# Patient Record
Sex: MALE | Race: WHITE
[De-identification: names, ages, dates, MRNs, and addresses within clinical notes are randomized per-mention and may not be internally consistent; named-entity substitution may affect disease eponyms.]

---

## 2017-04-19 ENCOUNTER — HOSPITAL ENCOUNTER (OUTPATIENT)
Dept: HOSPITAL 17 - HSDC | Age: 72
Setting detail: OBSERVATION
LOS: 1 days | Discharge: HOME | End: 2017-04-20
Attending: OTOLARYNGOLOGY | Admitting: OTOLARYNGOLOGY
Payer: MEDICARE

## 2017-04-19 VITALS
TEMPERATURE: 98.1 F | DIASTOLIC BLOOD PRESSURE: 72 MMHG | RESPIRATION RATE: 18 BRPM | SYSTOLIC BLOOD PRESSURE: 125 MMHG | OXYGEN SATURATION: 96 % | HEART RATE: 71 BPM

## 2017-04-19 VITALS
OXYGEN SATURATION: 95 % | RESPIRATION RATE: 18 BRPM | SYSTOLIC BLOOD PRESSURE: 137 MMHG | DIASTOLIC BLOOD PRESSURE: 78 MMHG | HEART RATE: 70 BPM | TEMPERATURE: 96.7 F

## 2017-04-19 VITALS — WEIGHT: 213.63 LBS | HEIGHT: 71 IN | BODY MASS INDEX: 29.91 KG/M2

## 2017-04-19 VITALS
TEMPERATURE: 96.1 F | HEART RATE: 76 BPM | SYSTOLIC BLOOD PRESSURE: 118 MMHG | DIASTOLIC BLOOD PRESSURE: 71 MMHG | OXYGEN SATURATION: 95 % | RESPIRATION RATE: 18 BRPM

## 2017-04-19 DIAGNOSIS — J34.3: ICD-10-CM

## 2017-04-19 DIAGNOSIS — R09.81: ICD-10-CM

## 2017-04-19 DIAGNOSIS — J34.2: Primary | ICD-10-CM

## 2017-04-19 DIAGNOSIS — H61.22: ICD-10-CM

## 2017-04-19 DIAGNOSIS — R51: ICD-10-CM

## 2017-04-19 PROCEDURE — 30520 REPAIR OF NASAL SEPTUM: CPT

## 2017-04-19 PROCEDURE — 00160 ANES PX NOSE&SINUS NOS: CPT

## 2017-04-19 PROCEDURE — G0378 HOSPITAL OBSERVATION PER HR: HCPCS

## 2017-04-19 PROCEDURE — 69210 REMOVE IMPACTED EAR WAX UNI: CPT

## 2017-04-19 PROCEDURE — 30140 RESECT INFERIOR TURBINATE: CPT

## 2017-04-19 RX ADMIN — HYDROCODONE BITARTRATE AND ACETAMINOPHEN PRN TAB: 5; 325 TABLET ORAL at 22:44

## 2017-04-19 RX ADMIN — SODIUM CHLORIDE SCH MLS/HR: 900 INJECTION INTRAVENOUS at 22:42

## 2017-04-19 RX ADMIN — METOPROLOL TARTRATE SCH MG: 25 TABLET, FILM COATED ORAL at 22:39

## 2017-04-19 RX ADMIN — SODIUM CHLORIDE SCH MLS/HR: 900 INJECTION INTRAVENOUS at 16:00

## 2017-04-20 VITALS
DIASTOLIC BLOOD PRESSURE: 53 MMHG | TEMPERATURE: 96.9 F | SYSTOLIC BLOOD PRESSURE: 101 MMHG | HEART RATE: 79 BPM | OXYGEN SATURATION: 93 % | RESPIRATION RATE: 18 BRPM

## 2017-04-20 VITALS
SYSTOLIC BLOOD PRESSURE: 131 MMHG | RESPIRATION RATE: 20 BRPM | DIASTOLIC BLOOD PRESSURE: 62 MMHG | TEMPERATURE: 97.5 F | OXYGEN SATURATION: 95 % | HEART RATE: 70 BPM

## 2017-04-20 VITALS
DIASTOLIC BLOOD PRESSURE: 60 MMHG | SYSTOLIC BLOOD PRESSURE: 99 MMHG | OXYGEN SATURATION: 95 % | RESPIRATION RATE: 20 BRPM | HEART RATE: 69 BPM | TEMPERATURE: 98.1 F

## 2017-04-20 RX ADMIN — METOPROLOL TARTRATE SCH MG: 25 TABLET, FILM COATED ORAL at 08:37

## 2017-04-20 RX ADMIN — SODIUM CHLORIDE SCH MLS/HR: 900 INJECTION INTRAVENOUS at 08:00

## 2017-04-20 RX ADMIN — HYDROCODONE BITARTRATE AND ACETAMINOPHEN PRN TAB: 5; 325 TABLET ORAL at 03:06

## 2017-06-23 NOTE — MP
cc:

KENRICK ESPINAL M.D.

****

 

 

DATE OF SURGERY:  4/19/2017

 

 

SURGEON

Dr. Kenrick Espinal

 

PREOPERATIVE DIAGNOSIS

1. Nasal airway obstruction.

2. Nasal septal deviation.

3. Hypertrophy of inferior turbinates.

 

POSTOPERATIVE DIAGNOSIS

1. Open repair nasal septal fracture.

2. Bilateral submucosal resection of inferior turbinates.

 

INDICATIONS

The indications are documented in the history and physical.

 

DESCRIPTION OF OPERATION

The patient was taken to OR #6, and placed in the supine position.  Following

induction of general anesthesia and intubation the nose was packed bilaterally

with cotton pledgets saturated in 0.05% oxymetazoline.  The nasal septum and

inferior turbinates were injected with a total of 12 mL of 1% Xylocaine with

epinephrine 1:100,000.  He was then prepped and draped for surgery.  Packing

was removed and a hemitransfixion incision was made in the left nasal

vestibule.  Through this incision the mucosa of septum was elevated bilaterally

as far as the junction of the bony and cartilaginous septum.  This revealed

quadrangular cartilage which showed evidence of long-healed cartilaginous

fracture with numerous fragments obstructing the nasal airway.  An accumulative

area of 2 x 2.5 cm was removed in a piecemeal fashion using a Eddy elevator

and Chivo-Yang forceps.  1.5 cm dorsal and caudal cartilaginous struts

were preserved.  When this was completed the mucosa was elevated from the bony

septum and from the maxillary crest.  These were removed using Chivo-Yang

forceps on the bony septum and a 6 mm Jann chisel on the maxillary crest.

The incision was then closed with a running suture of 4-0 chromic and the

mucosal layers of septum were approximated to each other with a quilting stitch

of 4-0 plain gut.  The inferior turbinates were then fractured out medially and

stab incisions were opened along their inferior surfaces.  Through these

incisions the submucosal soft tissue was reduced using a curet and preserving

the conchal bone.  The incision was then cauterized using the suction Bovie at

35 kaufman and the remnants of the inferior turbinates were re-lateralized to the

lateral nasal walls.  The nose was then packed with Merocel tampons coated in

bacitracin ointment and the procedure was terminated.  The patient was reversed

from anesthesia and taken to Recovery in good condition.  There were no

complications.  Blood loss was 80 mL.

 

 

 

 

 

                              _________________________________

                              MD MARIPOSA Askew

D:  6/22/2017/1:02 PM

T:  6/23/2017/7:48 AM

Visit #:  [id number]

Job #:  [id number]

## 2018-05-30 ENCOUNTER — HOSPITAL ENCOUNTER (OUTPATIENT)
Dept: HOSPITAL 17 - NEPE | Age: 73
Setting detail: OBSERVATION
Discharge: HOME | End: 2018-05-30
Attending: INTERNAL MEDICINE | Admitting: INTERNAL MEDICINE
Payer: MEDICARE

## 2018-05-30 VITALS
RESPIRATION RATE: 20 BRPM | OXYGEN SATURATION: 95 % | SYSTOLIC BLOOD PRESSURE: 142 MMHG | HEART RATE: 74 BPM | TEMPERATURE: 98.5 F | DIASTOLIC BLOOD PRESSURE: 104 MMHG

## 2018-05-30 VITALS
SYSTOLIC BLOOD PRESSURE: 158 MMHG | DIASTOLIC BLOOD PRESSURE: 94 MMHG | OXYGEN SATURATION: 97 % | RESPIRATION RATE: 18 BRPM | HEART RATE: 70 BPM

## 2018-05-30 VITALS
HEART RATE: 69 BPM | DIASTOLIC BLOOD PRESSURE: 82 MMHG | TEMPERATURE: 97.6 F | RESPIRATION RATE: 17 BRPM | OXYGEN SATURATION: 97 % | SYSTOLIC BLOOD PRESSURE: 143 MMHG

## 2018-05-30 VITALS — HEART RATE: 69 BPM

## 2018-05-30 VITALS
OXYGEN SATURATION: 94 % | TEMPERATURE: 96.3 F | HEART RATE: 71 BPM | SYSTOLIC BLOOD PRESSURE: 116 MMHG | RESPIRATION RATE: 19 BRPM | DIASTOLIC BLOOD PRESSURE: 67 MMHG

## 2018-05-30 VITALS — OXYGEN SATURATION: 97 %

## 2018-05-30 VITALS — BODY MASS INDEX: 31.17 KG/M2 | WEIGHT: 222.67 LBS | HEIGHT: 71 IN

## 2018-05-30 VITALS
DIASTOLIC BLOOD PRESSURE: 71 MMHG | TEMPERATURE: 98.1 F | SYSTOLIC BLOOD PRESSURE: 119 MMHG | HEART RATE: 72 BPM | OXYGEN SATURATION: 97 % | RESPIRATION RATE: 18 BRPM

## 2018-05-30 DIAGNOSIS — I71.4: ICD-10-CM

## 2018-05-30 DIAGNOSIS — R09.81: ICD-10-CM

## 2018-05-30 DIAGNOSIS — R53.83: ICD-10-CM

## 2018-05-30 DIAGNOSIS — I25.10: ICD-10-CM

## 2018-05-30 DIAGNOSIS — I47.1: ICD-10-CM

## 2018-05-30 DIAGNOSIS — I25.82: ICD-10-CM

## 2018-05-30 DIAGNOSIS — Z87.891: ICD-10-CM

## 2018-05-30 DIAGNOSIS — E78.5: ICD-10-CM

## 2018-05-30 DIAGNOSIS — Z86.79: ICD-10-CM

## 2018-05-30 DIAGNOSIS — G89.29: ICD-10-CM

## 2018-05-30 DIAGNOSIS — Z79.899: ICD-10-CM

## 2018-05-30 DIAGNOSIS — I71.9: ICD-10-CM

## 2018-05-30 DIAGNOSIS — R94.31: ICD-10-CM

## 2018-05-30 DIAGNOSIS — I42.2: ICD-10-CM

## 2018-05-30 DIAGNOSIS — E78.00: ICD-10-CM

## 2018-05-30 DIAGNOSIS — R06.03: Primary | ICD-10-CM

## 2018-05-30 DIAGNOSIS — K57.92: ICD-10-CM

## 2018-05-30 DIAGNOSIS — Z85.820: ICD-10-CM

## 2018-05-30 DIAGNOSIS — Z79.01: ICD-10-CM

## 2018-05-30 DIAGNOSIS — J18.9: ICD-10-CM

## 2018-05-30 DIAGNOSIS — F41.9: ICD-10-CM

## 2018-05-30 DIAGNOSIS — I48.0: ICD-10-CM

## 2018-05-30 DIAGNOSIS — M54.9: ICD-10-CM

## 2018-05-30 DIAGNOSIS — R20.0: ICD-10-CM

## 2018-05-30 DIAGNOSIS — R51: ICD-10-CM

## 2018-05-30 DIAGNOSIS — M54.30: ICD-10-CM

## 2018-05-30 DIAGNOSIS — Z95.0: ICD-10-CM

## 2018-05-30 DIAGNOSIS — I10: ICD-10-CM

## 2018-05-30 LAB
ALBUMIN SERPL-MCNC: 4 GM/DL (ref 3.4–5)
ALP SERPL-CCNC: 94 U/L (ref 45–117)
ALT SERPL-CCNC: 22 U/L (ref 12–78)
AST SERPL-CCNC: 15 U/L (ref 15–37)
BASOPHILS # BLD AUTO: 0.1 TH/MM3 (ref 0–0.2)
BASOPHILS NFR BLD: 0.9 % (ref 0–2)
BILIRUB SERPL-MCNC: 2.7 MG/DL (ref 0.2–1)
BUN SERPL-MCNC: 15 MG/DL (ref 7–18)
CALCIUM SERPL-MCNC: 8.6 MG/DL (ref 8.5–10.1)
CHLORIDE SERPL-SCNC: 107 MEQ/L (ref 98–107)
COLOR UR: YELLOW
CREAT SERPL-MCNC: 1.08 MG/DL (ref 0.6–1.3)
EOSINOPHIL # BLD: 0.1 TH/MM3 (ref 0–0.4)
EOSINOPHIL NFR BLD: 1.3 % (ref 0–4)
ERYTHROCYTE [DISTWIDTH] IN BLOOD BY AUTOMATED COUNT: 15.7 % (ref 11.6–17.2)
GFR SERPLBLD BASED ON 1.73 SQ M-ARVRAT: 67 ML/MIN (ref 89–?)
GLUCOSE SERPL-MCNC: 126 MG/DL (ref 74–106)
GLUCOSE UR STRIP-MCNC: (no result) MG/DL
HCO3 BLD-SCNC: 24.9 MEQ/L (ref 21–32)
HCT VFR BLD CALC: 42.2 % (ref 39–51)
HGB BLD-MCNC: 14.5 GM/DL (ref 13–17)
HGB UR QL STRIP: (no result)
INR PPP: 1.3 RATIO
KETONES UR STRIP-MCNC: (no result) MG/DL
LYMPHOCYTES # BLD AUTO: 1.1 TH/MM3 (ref 1–4.8)
LYMPHOCYTES NFR BLD AUTO: 10.8 % (ref 9–44)
MAGNESIUM SERPL-MCNC: 2.1 MG/DL (ref 1.5–2.5)
MCH RBC QN AUTO: 29.9 PG (ref 27–34)
MCHC RBC AUTO-ENTMCNC: 34.3 % (ref 32–36)
MCV RBC AUTO: 87 FL (ref 80–100)
MONOCYTE #: 0.6 TH/MM3 (ref 0–0.9)
MONOCYTES NFR BLD: 6.3 % (ref 0–8)
MUCOUS THREADS #/AREA URNS LPF: (no result) /LPF
NEUTROPHILS # BLD AUTO: 8.3 TH/MM3 (ref 1.8–7.7)
NEUTROPHILS NFR BLD AUTO: 80.7 % (ref 16–70)
NITRITE UR QL STRIP: (no result)
PLATELET # BLD: 128 TH/MM3 (ref 150–450)
PMV BLD AUTO: 9.6 FL (ref 7–11)
PROT SERPL-MCNC: 7.2 GM/DL (ref 6.4–8.2)
PROTHROMBIN TIME: 13.2 SEC (ref 9.8–11.6)
RBC # BLD AUTO: 4.85 MIL/MM3 (ref 4.5–5.9)
SODIUM SERPL-SCNC: 141 MEQ/L (ref 136–145)
SP GR UR STRIP: 1.01 (ref 1–1.03)
SQUAMOUS #/AREA URNS HPF: <1 /HPF (ref 0–5)
TROPONIN I SERPL-MCNC: (no result) NG/ML (ref 0.02–0.05)
TROPONIN I SERPL-MCNC: (no result) NG/ML (ref 0.02–0.05)
TROPONIN I SERPL-MCNC: 0.02 NG/ML (ref 0.02–0.05)
URINE LEUKOCYTE ESTERASE: (no result)
WBC # BLD AUTO: 10.3 TH/MM3 (ref 4–11)

## 2018-05-30 PROCEDURE — 99285 EMERGENCY DEPT VISIT HI MDM: CPT

## 2018-05-30 PROCEDURE — 71045 X-RAY EXAM CHEST 1 VIEW: CPT

## 2018-05-30 PROCEDURE — 85730 THROMBOPLASTIN TIME PARTIAL: CPT

## 2018-05-30 PROCEDURE — 82550 ASSAY OF CK (CPK): CPT

## 2018-05-30 PROCEDURE — 83880 ASSAY OF NATRIURETIC PEPTIDE: CPT

## 2018-05-30 PROCEDURE — 83690 ASSAY OF LIPASE: CPT

## 2018-05-30 PROCEDURE — 93005 ELECTROCARDIOGRAM TRACING: CPT

## 2018-05-30 PROCEDURE — 83735 ASSAY OF MAGNESIUM: CPT

## 2018-05-30 PROCEDURE — 80053 COMPREHEN METABOLIC PANEL: CPT

## 2018-05-30 PROCEDURE — 81001 URINALYSIS AUTO W/SCOPE: CPT

## 2018-05-30 PROCEDURE — 85610 PROTHROMBIN TIME: CPT

## 2018-05-30 PROCEDURE — G0378 HOSPITAL OBSERVATION PER HR: HCPCS

## 2018-05-30 PROCEDURE — 85025 COMPLETE CBC W/AUTO DIFF WBC: CPT

## 2018-05-30 PROCEDURE — 84484 ASSAY OF TROPONIN QUANT: CPT
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
## 2018-05-30 NOTE — HHI.HP
HPI


Primary Care Physician


[facility]


Chief Complaint


High blood pressure


History of Present Illness


This is a 73-year-old male with history of hypertrophic cardiomyopathy, 

hypertension, hyperlipidemia, SVT status post multiple ablations, paroxysmal 

atrial relation with ablations, ventricular paced, totally for the RCA with 

collateralization that presents to ED to be evaluated for high blood pressure 

and shortness of breath.  Denies having chest discomfort of any type.  States 

that his blood pressure is going up and down throughout the day yesterday and 

he also intermittently had shortness of breath.  No nausea or diaphoresis.  

Voices compliance with his blood pressure medication.  States he takes 

metoprolol 50 mg twice a day.  Also states he recently had a stress test and an 

echo at his office last month.  Feeling okay at this time.





Review of Systems


General: Patient denies fevers, chills, and recent travel.


HEENT: Patient denies headache, sore throat, difficulty swallowing.  


Cardiovascular: Denies chest discomfort as mentioned above.  Denies sensation 

of heart beating rapidly or irregularly.  No syncope.  Denies diaphoresis.


Respiratory: He has been intermittently short of breath.  Denies inspirational 

chest discomfort.  Denies coughing wheezing or hemoptysis.  


GI: Patient denies nausea, vomiting, diarrhea, abdominal pain, bloody stools.


Musculoskeletal: Patient denies joint pain or edema.  Denies calf pain or edema.


Neurovascular: Patient denies numbness, tingling, weakness in extremities.  

Denies headache.


Endocrine: Denies polyuria and polydipsia.


Hematologic: Denies easy bruising.


Skin: Denies rash or itching.





Past Family Social History


Allergies:  


Coded Allergies:  


     No Known Allergies (Verified , 17)


Past Medical History


Hypertrophic cardiomyopathy, SVT status post multiple ablations, proximal 

atrial fibrillation with ablations, ventricular paced, hypertension, 

hyperlipidemia, history of abdominal aortic aneurysm, and also history of 

totally occluded RCA with collateralization.  Denies diabetes.


Past Surgical History


Multiple ablations.  Cardiac catheterizations.  Pacemaker.  Cholecystectomy, 

knee replacement, deviated septum repair


Reported Medications





Reported Meds & Active Scripts


Active


Reported


Eliquis (Apixaban) 5 Mg Tab 5 Mg PO BID


Ibgard (Peppermint Oil) 90 Mg Capdr...er   


Sudafed (Pseudoephedrine HCl) 30 Mg Tab 30 Mg PO Q6H PRN


Potassium Gluconate 550 Mg Tab 1 Tab PO DAILY


Ambien (Zolpidem Tartrate) 10 Mg Tab 10 Mg PO HS PRN


Xanax (Alprazolam) 0.5 Mg Tab 0.5 Mg PO Q4H PRN


Multivitamin Adults (Multiple Vitamins W/ Minerals) 1 Tab 1 Tab PO DAILY


Metoprolol Tartrate 25 Mg Tab 50 Mg PO BID


Atorvastatin (Atorvastatin Calcium) 10 Mg Tab 10 Mg PO HS


Active Ordered Medications





Current Medications








 Medications


  (Trade)  Dose


 Ordered  Sig/Yulissa


 Route  Start Time


 Stop Time Status Last Admin


 


  (NS Flush)  2 ml  UNSCH  PRN


 IV FLUSH  18 03:15


     


 


 


  (NS Flush)  2 ml  BID


 IV FLUSH  18 09:00


     


 


 


  (Tylenol)  500 mg  Q4H  PRN


 PO  18 03:15


     


 


 


  (Xanax)  0.5 mg  Q4H  PRN


 PO  18 08:45


     


 


 


  (Lipitor)  10 mg  HS


 PO  18 21:00


     


 


 


  (Lopressor)  50 mg  BID


 PO  18 09:00


     


 


 


  (Theragran M Tab)  1 tab  DAILY


 PO  18 09:00


     


 


 


  (Ambien)  10 mg  HS  PRN


 PO  18 08:45


     


 


 


 Patient Own


 Medication  PT OWN


 MED:


 POTASS...  DAILY


 PO  18 09:00


   Future Hold  


 








Family History


There is family history of CAD.


Social History


Quit smoking 10 years ago.  Rarely has alcohol.  Denies illicit drug use.





Physical Exam


Vital Signs





Vital Signs








  Date Time  Temp Pulse Resp B/P (MAP) Pulse Ox O2 Delivery O2 Flow Rate FiO2


 


18 08:25 96.3 71 19 116/67 (83) 94   


 


18 06:26        21


 


18 05:53  69      


 


18 05:15 98.1 72 18 119/71 (87) 97   


 


18 03:43 97.6 69 17 143/82 (102) 97 Nasal Cannula 2.00 


 


18 01:23     97 Nasal Cannula 2.00 


 


18 01:06      Nasal Cannula 2.00 


 


18 00:51  70 18 158/94 (115) 97 Nasal Cannula 2.00 


 


18 00:38 98.5 74 20 142/104 (117) 95   








Physical Exam


GENERAL: This is a well-nourished, well-developed patient, in no apparent 

distress.  Patient speaks in clear complete sentences.  Patient is pleasant.


HEENT: Head is atraumatic and normocephalic.  Neck is supple without 

lymphadenopathy and trachea is midline.  No JVD or carotid bruits.


CARDIOVASCULAR: Regular rate and rhythm without murmurs, gallops, or rubs. 


RESPIRATORY: Clear to auscultation. Breath sounds equal bilaterally. No wheezes

, rales, or rhonchi.  Chest wall is nontender.  No use of accessory muscles.


GASTROINTESTINAL: Abdomen is nontender, nondistended.  Abdomen soft.  No 

obvious pulsatile mass or bruit.  No CVA tenderness.  Strong femoral pulses 

bilaterally.  Normal bowel sounds in all quadrants.


MUSCULOSKELETAL: Patient is moving upper and lower extremities freely.  No calf 

tenderness or edema, no Homans sign.  Strong pulses in upper and lower 

extremities.


NEUROLOGICAL: Patient is alert and oriented.  Cranial nerves 2-12 are grossly 

intact.  No focal deficits and speech is clear.


SKIN: No rash and turgor is normal.


Laboratory





Laboratory Tests








Test


  18


00:10 18


00:30 18


03:30 18


06:30


 


Urine Color YELLOW    


 


Urine Turbidity CLEAR    


 


Urine pH 6.0    


 


Urine Specific Gravity 1.009    


 


Urine Protein TRACE    


 


Urine Glucose (UA) NEG    


 


Urine Ketones NEG    


 


Urine Occult Blood NEG    


 


Urine Nitrite NEG    


 


Urine Bilirubin NEG    


 


Urine Urobilinogen LESS THAN 2.0    


 


Urine Leukocyte Esterase NEG    


 


Urine RBC LESS THAN 1    


 


Urine WBC 1    


 


Urine Squamous Epithelial


Cells <1 


  


  


  


 


 


Urine Granular Casts 25    


 


Urine Mucus FEW    


 


Microscopic Urinalysis Comment


  CULT NOT


INDICATED 


  


  


 


 


White Blood Count  10.3   


 


Red Blood Count  4.85   


 


Hemoglobin  14.5   


 


Hematocrit  42.2   


 


Mean Corpuscular Volume  87.0   


 


Mean Corpuscular Hemoglobin  29.9   


 


Mean Corpuscular Hemoglobin


Concent 


  34.3 


  


  


 


 


Red Cell Distribution Width  15.7   


 


Platelet Count  128   


 


Mean Platelet Volume  9.6   


 


Neutrophils (%) (Auto)  80.7   


 


Lymphocytes (%) (Auto)  10.8   


 


Monocytes (%) (Auto)  6.3   


 


Eosinophils (%) (Auto)  1.3   


 


Basophils (%) (Auto)  0.9   


 


Neutrophils # (Auto)  8.3   


 


Lymphocytes # (Auto)  1.1   


 


Monocytes # (Auto)  0.6   


 


Eosinophils # (Auto)  0.1   


 


Basophils # (Auto)  0.1   


 


CBC Comment  DIFF FINAL   


 


Differential Comment     


 


Prothrombin Time  13.2   


 


Prothromb Time International


Ratio 


  1.3 


  


  


 


 


Activated Partial


Thromboplast Time 


  35.8 


  


  


 


 


Blood Urea Nitrogen  15   


 


Creatinine  1.08   


 


Random Glucose  126   


 


Total Protein  7.2   


 


Albumin  4.0   


 


Calcium Level  8.6   


 


Magnesium Level  2.1   


 


Alkaline Phosphatase  94   


 


Aspartate Amino Transf


(AST/SGOT) 


  15 


  


  


 


 


Alanine Aminotransferase


(ALT/SGPT) 


  22 


  


  


 


 


Total Bilirubin  2.7   


 


Sodium Level  141   


 


Potassium Level  4.2   


 


Chloride Level  107   


 


Carbon Dioxide Level  24.9   


 


Anion Gap  9   


 


Estimat Glomerular Filtration


Rate 


  67 


  


  


 


 


Total Creatine Kinase  38  34  30 


 


Troponin I  0.02  LESS THAN 0.02  LESS THAN 0.02 


 


B-Type Natriuretic Peptide  243   


 


Lipase  232   








Result Diagram:  


18





Imaging





Last 48 hours Impressions








Chest X-Ray 18 Signed





Impressions: 





 CONCLUSION:  No acute cardiopulmonary disease.  





  





 








Course


EKGs show ventricular pacing.





Caprini VTE Risk Assessment


Caprini VTE Risk Assessment:  Mod/High Risk (score >= 2)


Caprini Risk Assessment Model











 Point Value = 1          Point Value = 2  Point Value = 3  Point Value = 5


 


Age 41-60


Minor surgery


BMI > 25 kg/m2


Swollen legs


Varicose veins


Pregnancy or postpartum


History of unexplained or recurrent


   spontaneous 


Oral contraceptives or hormone


   replacement


Sepsis (< 1 month)


Serious lung disease, including


   pneumonia (< 1 month)


Abnormal pulmonary function


Acute myocardial infarction


Congestive heart failure (< 1 month)


History of inflammatory bowel disease


Medical patient at bed rest Age 61-74


Arthroscopic surgery


Major open surgery (> 45 min)


Laparoscopic surgery (> 45 min)


Malignancy


Confined to bed (> 72 hours)


Immobilizing plaster cast


Central venous access Age >= 75


History of VTE


Family history of VTE


Factor V Leiden


Prothrombin 11135Y


Lupus anticoagulant


Anticardiolipin antibodies


Elevated serum homocysteine


Heparin-induced thrombocytopenia


Other congenital or acquired


   thrombophilia Stroke (< 1 month)


Elective arthroplasty


Hip, pelvis, or leg fracture


Acute spinal cord injury (< 1 month)








Prophylaxis Regimen











   Total Risk


Factor Score Risk Level Prophylaxis Regimen


 


0-1      Low Early ambulation


 


2 Moderate Order ONE of the following:


*Sequential Compression Device (SCD)


*Heparin 5000 units SQ BID


 


3-4 Higher Order ONE of the following medications:


*Heparin 5000 units SQ TID


*Enoxaparin/Lovenox 40 mg SQ daily (WT < 150 kg, CrCl > 30 mL/min)


*Enoxaparin/Lovenox 30 mg SQ daily (WT < 150 kg, CrCl > 10-29 mL/min)


*Enoxaparin/Lovenox 30 mg SQ BID (WT < 150 kg, CrCl > 30 mL/min)


AND/OR


*Sequential Compression Device (SCD)


 


5 or more Highest Order ONE of the following medications:


*Heparin 5000 units SQ TID (Preferred with Epidurals)


*Enoxaparin/Lovenox 40 mg SQ daily (WT < 150 kg, CrCl > 30 mL/min)


*Enoxaparin/Lovenox 30 mg SQ daily (WT < 150 kg, CrCl > 10-29 mL/min)


*Enoxaparin/Lovenox 30 mg SQ BID (WT < 150 kg, CrCl > 30 mL/min)


AND


*Sequential Compression Device (SCD)











Assessment and Plan


Assessment and Plan


* Hypertension: Patient presented to the ED with main complaint of 

hypertension.  However since he has been in the hospital is blood pressures 

have been okay.  Most recently blood pressure was 116/67.  He was admitted to 

the chest pain center.  He denies chest discomfort was cleaning of some 

shortness of breath.  He was seen by Dr. Magana of cardiology in the chest 

pain center.  I discussed this patient with Dr. Juanpablo Dee.  He is having 

most recent stress test and echo faxed to the chest pain center and will come 

to the chest pain center after his heart catheterization to further discuss 

this patient's plan.  We will resume his medication at this time.  Possibly we 

will be adjusting the medication.


* Hypertrophic cardiomyopathy: Continue his beta-blocker.  This does possibly 

will be increased, waiting Dr. Juanpablo Dee's input.


* Hyperlipidemia: Continue medication.


* History of paroxysmal atrial fibrillation: Continue medications.


* Patient is ventricularly paced.  Continues follow-up with his cardiologist.


* History of abdominal aortic aneurysm: Records from Dr. Dee's office 

indicate on an echo that the abdominal aortic aneurysm is essentially unchanged 

at 2.9 x 3.0 cm..  He should continue follow-up with his cardiologist.


Patient is stable at this time.  He is agreeable to this plan.





Dr. Dee talked with the patient.  His symptoms are likely related to taking 

Sudafed.  He has been advised to no longer use Sudafed or other decongestants.  

He will be discharged at this time.











Ezio Almanzar May 30, 2018 09:32

## 2018-05-30 NOTE — EKG
Date Performed: 05/30/2018       Time Performed: 00:36:16

 

PTAGE:      73 years

 

EKG:      ELECTRONIC VENTRICULAR PACEMAKER ABNORMAL RHYTHM ECG Since 

 

 PREVIOUS TRACING            , no significant change noted

 

DOCTOR:   Sarah Magana  Interpretating Date/Time  05/30/2018 17:43:20

## 2018-05-30 NOTE — RADRPT
EXAM DATE:  5/30/2018 1:24 AM EDT

AGE/SEX:        73 years / Male



INDICATIONS:  Short of breath.



CLINICAL DATA:  This is the patient's initial encounter. Patient reports that signs and symptoms have
 been present for 1 day and indicates a pain score of 0/10. 

                                                                          

MEDICAL/SURGICAL HISTORY:       None. Pacemaker.



[place]      [facility] [place]  .



FINDINGS:  The lungs are clear without infiltrate, nodule, or mass.  There is no appreciable pleural 
effusion for technique.  Heart and mediastinum are stable and mild cardiomegaly seen. Left subclavian
 transvenous pacer wires are present with tips in the right atrium and right ventricle.  



CONCLUSION:  No acute cardiopulmonary disease.  



Electronically signed by: NOAH Mccracken MD  5/30/2018 1:25 AM EDT

## 2018-05-30 NOTE — EKG
Date Performed: 2018       Time Performed: 07:03:25

 

PTAGE:      73 years

 

EKG:      ELECTRONIC VENTRICULAR PACEMAKER ABNORMAL RHYTHM ECG Since 

 

 PREVIOUS TRACING            , no significant change noted PREVIOUS TRACIN2018 03.41

 

DOCTOR:   Sarah Magana  Interpretating Date/Time  2018 17:42:46

## 2018-05-30 NOTE — HHI.DCPOC
Discharge Care Plan


Diagnosis:  


(1) HTN (hypertension)


(2) CAD (coronary artery disease)


(3) Hypertrophic cardiomyopathy


(4) Hx of abdominal aortic aneurysm


(5) Ventricular pacing seen on electrocardiogram


(6) Hyperlipidemia


(7) Paroxysmal A-fib


Goals to Promote Your Health


* To prevent worsening of your condition and complications


* To maintain your health at the optimal level


Directions to Meet Your Goals


*** Take your medications as prescribed


*** Follow your dietary instruction


*** Follow activity as directed








*** Keep your appointments as scheduled


*** Take your immunizations and boosters as scheduled


*** If your symptoms worsen call your PCP, if no PCP go to Urgent Care Center 

or Emergency Room***


*** Smoking is Dangerous to Your Health. Avoid second hand smoke***


***Call the 24-hour hour crisis hotline for domestic abuse at 1-415.152.9398***











Ezio Almanzar May 30, 2018 10:51

## 2018-05-30 NOTE — EKG
Date Performed: 2018       Time Performed: 03:41:41

 

PTAGE:      73 years

 

EKG:      ELECTRONIC VENTRICULAR PACEMAKER ABNORMAL RHYTHM ECG Since 

 

 PREVIOUS TRACING            , no significant change noted PREVIOUS TRACIN2016 16.51

 

DOCTOR:   Sarah Magana  Interpretating Date/Time  2018 17:42:58